# Patient Record
Sex: MALE | Race: BLACK OR AFRICAN AMERICAN
[De-identification: names, ages, dates, MRNs, and addresses within clinical notes are randomized per-mention and may not be internally consistent; named-entity substitution may affect disease eponyms.]

---

## 2020-02-20 ENCOUNTER — HOSPITAL ENCOUNTER (OUTPATIENT)
Dept: HOSPITAL 92 - SCSRAD | Age: 40
Discharge: HOME | End: 2020-02-20
Attending: FAMILY MEDICINE
Payer: COMMERCIAL

## 2020-02-20 DIAGNOSIS — S02.651A: ICD-10-CM

## 2020-02-20 DIAGNOSIS — S09.93XA: Primary | ICD-10-CM

## 2020-02-20 PROCEDURE — 70100 X-RAY EXAM OF JAW <4VIEWS: CPT

## 2020-02-20 NOTE — RAD
Mandible 2 views:



HISTORY:

Blunt force trauma, right mandibular injury.



FINDINGS:

Nondisplaced fracture through the angle of the right mandible.



No other significant acute process.



IMPRESSION:

Nondisplaced fracture through the angle of the right mandible.



CODE T



Reported By: Giorgio Valdovinos 

Electronically Signed:  2/20/2020 1:02 PM